# Patient Record
Sex: MALE | Race: WHITE | URBAN - METROPOLITAN AREA
[De-identification: names, ages, dates, MRNs, and addresses within clinical notes are randomized per-mention and may not be internally consistent; named-entity substitution may affect disease eponyms.]

---

## 2017-02-18 ENCOUNTER — APPOINTMENT (OUTPATIENT)
Dept: GENERAL RADIOLOGY | Facility: CLINIC | Age: 57
End: 2017-02-18
Attending: EMERGENCY MEDICINE
Payer: COMMERCIAL

## 2017-02-18 ENCOUNTER — HOSPITAL ENCOUNTER (OUTPATIENT)
Facility: CLINIC | Age: 57
Setting detail: OBSERVATION
Discharge: HOME | End: 2017-02-19
Attending: EMERGENCY MEDICINE | Admitting: HOSPITALIST
Payer: COMMERCIAL

## 2017-02-18 DIAGNOSIS — R07.9 ACUTE CHEST PAIN: ICD-10-CM

## 2017-02-18 LAB
ANION GAP SERPL CALCULATED.3IONS-SCNC: 6 MMOL/L (ref 3–14)
BASOPHILS # BLD AUTO: 0.1 10E9/L (ref 0–0.2)
BASOPHILS NFR BLD AUTO: 1.4 %
BUN SERPL-MCNC: 11 MG/DL (ref 7–30)
CALCIUM SERPL-MCNC: 8.8 MG/DL (ref 8.5–10.1)
CHLORIDE SERPL-SCNC: 102 MMOL/L (ref 94–109)
CO2 SERPL-SCNC: 30 MMOL/L (ref 20–32)
CREAT SERPL-MCNC: 1.08 MG/DL (ref 0.66–1.25)
D DIMER PPP FEU-MCNC: NORMAL UG/ML FEU (ref 0–0.5)
DIFFERENTIAL METHOD BLD: ABNORMAL
EOSINOPHIL # BLD AUTO: 0.1 10E9/L (ref 0–0.7)
EOSINOPHIL NFR BLD AUTO: 3.2 %
ERYTHROCYTE [DISTWIDTH] IN BLOOD BY AUTOMATED COUNT: 12.1 % (ref 10–15)
GFR SERPL CREATININE-BSD FRML MDRD: 70 ML/MIN/1.7M2
GLUCOSE SERPL-MCNC: 95 MG/DL (ref 70–99)
HCT VFR BLD AUTO: 44.4 % (ref 40–53)
HGB BLD-MCNC: 14.9 G/DL (ref 13.3–17.7)
IMM GRANULOCYTES # BLD: 0 10E9/L (ref 0–0.4)
IMM GRANULOCYTES NFR BLD: 0.2 %
LYMPHOCYTES # BLD AUTO: 1.3 10E9/L (ref 0.8–5.3)
LYMPHOCYTES NFR BLD AUTO: 30.7 %
MCH RBC QN AUTO: 33.4 PG (ref 26.5–33)
MCHC RBC AUTO-ENTMCNC: 33.6 G/DL (ref 31.5–36.5)
MCV RBC AUTO: 100 FL (ref 78–100)
MONOCYTES # BLD AUTO: 0.6 10E9/L (ref 0–1.3)
MONOCYTES NFR BLD AUTO: 13.3 %
NEUTROPHILS # BLD AUTO: 2.2 10E9/L (ref 1.6–8.3)
NEUTROPHILS NFR BLD AUTO: 51.2 %
NRBC # BLD AUTO: 0 10*3/UL
NRBC BLD AUTO-RTO: 0 /100
PLATELET # BLD AUTO: 193 10E9/L (ref 150–450)
POTASSIUM SERPL-SCNC: 4.5 MMOL/L (ref 3.4–5.3)
RBC # BLD AUTO: 4.46 10E12/L (ref 4.4–5.9)
SODIUM SERPL-SCNC: 138 MMOL/L (ref 133–144)
TROPONIN I BLD-MCNC: 0 UG/L (ref 0–0.1)
TROPONIN I SERPL-MCNC: NORMAL UG/L (ref 0–0.04)
WBC # BLD AUTO: 4.4 10E9/L (ref 4–11)

## 2017-02-18 PROCEDURE — G0378 HOSPITAL OBSERVATION PER HR: HCPCS

## 2017-02-18 PROCEDURE — 80048 BASIC METABOLIC PNL TOTAL CA: CPT | Performed by: EMERGENCY MEDICINE

## 2017-02-18 PROCEDURE — 25000132 ZZH RX MED GY IP 250 OP 250 PS 637

## 2017-02-18 PROCEDURE — 84484 ASSAY OF TROPONIN QUANT: CPT

## 2017-02-18 PROCEDURE — 99220 ZZC INITIAL OBSERVATION CARE,LEVL III: CPT | Performed by: PHYSICIAN ASSISTANT

## 2017-02-18 PROCEDURE — 85025 COMPLETE CBC W/AUTO DIFF WBC: CPT | Performed by: EMERGENCY MEDICINE

## 2017-02-18 PROCEDURE — 84484 ASSAY OF TROPONIN QUANT: CPT | Mod: 91 | Performed by: EMERGENCY MEDICINE

## 2017-02-18 PROCEDURE — 85379 FIBRIN DEGRADATION QUANT: CPT | Performed by: EMERGENCY MEDICINE

## 2017-02-18 PROCEDURE — 93005 ELECTROCARDIOGRAM TRACING: CPT

## 2017-02-18 PROCEDURE — 71020 XR CHEST 2 VW: CPT

## 2017-02-18 PROCEDURE — 25000132 ZZH RX MED GY IP 250 OP 250 PS 637: Performed by: PHYSICIAN ASSISTANT

## 2017-02-18 PROCEDURE — 99285 EMERGENCY DEPT VISIT HI MDM: CPT | Mod: 25

## 2017-02-18 PROCEDURE — 84484 ASSAY OF TROPONIN QUANT: CPT | Mod: 91 | Performed by: PHYSICIAN ASSISTANT

## 2017-02-18 PROCEDURE — 36415 COLL VENOUS BLD VENIPUNCTURE: CPT | Performed by: PHYSICIAN ASSISTANT

## 2017-02-18 RX ORDER — ASPIRIN 81 MG/1
81 TABLET, CHEWABLE ORAL DAILY
Status: DISCONTINUED | OUTPATIENT
Start: 2017-02-19 | End: 2017-02-19 | Stop reason: HOSPADM

## 2017-02-18 RX ORDER — CITALOPRAM HYDROBROMIDE 20 MG/1
40 TABLET ORAL DAILY
Status: DISCONTINUED | OUTPATIENT
Start: 2017-02-19 | End: 2017-02-19 | Stop reason: HOSPADM

## 2017-02-18 RX ORDER — ALUMINA, MAGNESIA, AND SIMETHICONE 2400; 2400; 240 MG/30ML; MG/30ML; MG/30ML
15-30 SUSPENSION ORAL EVERY 4 HOURS PRN
Status: DISCONTINUED | OUTPATIENT
Start: 2017-02-18 | End: 2017-02-19 | Stop reason: HOSPADM

## 2017-02-18 RX ORDER — LIDOCAINE 40 MG/G
CREAM TOPICAL
Status: DISCONTINUED | OUTPATIENT
Start: 2017-02-18 | End: 2017-02-19 | Stop reason: HOSPADM

## 2017-02-18 RX ORDER — ASPIRIN 81 MG/1
324 TABLET, CHEWABLE ORAL ONCE
Status: COMPLETED | OUTPATIENT
Start: 2017-02-18 | End: 2017-02-18

## 2017-02-18 RX ORDER — NITROGLYCERIN 0.4 MG/1
0.4 TABLET SUBLINGUAL EVERY 5 MIN PRN
Status: DISCONTINUED | OUTPATIENT
Start: 2017-02-18 | End: 2017-02-19 | Stop reason: HOSPADM

## 2017-02-18 RX ORDER — NALOXONE HYDROCHLORIDE 0.4 MG/ML
.1-.4 INJECTION, SOLUTION INTRAMUSCULAR; INTRAVENOUS; SUBCUTANEOUS
Status: DISCONTINUED | OUTPATIENT
Start: 2017-02-18 | End: 2017-02-19 | Stop reason: HOSPADM

## 2017-02-18 RX ORDER — OXYCODONE HYDROCHLORIDE 5 MG/1
5-10 TABLET ORAL
Status: DISCONTINUED | OUTPATIENT
Start: 2017-02-18 | End: 2017-02-19 | Stop reason: HOSPADM

## 2017-02-18 RX ORDER — ACETAMINOPHEN 325 MG/1
650 TABLET ORAL EVERY 4 HOURS PRN
Status: DISCONTINUED | OUTPATIENT
Start: 2017-02-18 | End: 2017-02-19 | Stop reason: HOSPADM

## 2017-02-18 RX ORDER — PANTOPRAZOLE SODIUM 40 MG/1
40 TABLET, DELAYED RELEASE ORAL
Status: DISCONTINUED | OUTPATIENT
Start: 2017-02-18 | End: 2017-02-19 | Stop reason: HOSPADM

## 2017-02-18 RX ORDER — BUPROPION HYDROCHLORIDE 150 MG/1
300 TABLET ORAL DAILY
Status: DISCONTINUED | OUTPATIENT
Start: 2017-02-19 | End: 2017-02-19 | Stop reason: HOSPADM

## 2017-02-18 RX ORDER — MORPHINE SULFATE 2 MG/ML
1 INJECTION, SOLUTION INTRAMUSCULAR; INTRAVENOUS
Status: DISCONTINUED | OUTPATIENT
Start: 2017-02-18 | End: 2017-02-19 | Stop reason: HOSPADM

## 2017-02-18 RX ORDER — ASPIRIN 81 MG/1
TABLET, CHEWABLE ORAL
Status: COMPLETED
Start: 2017-02-18 | End: 2017-02-18

## 2017-02-18 RX ADMIN — ASPIRIN 81 MG 324 MG: 81 TABLET ORAL at 15:11

## 2017-02-18 RX ADMIN — PANTOPRAZOLE SODIUM 40 MG: 40 TABLET, DELAYED RELEASE ORAL at 19:13

## 2017-02-18 RX ADMIN — ASPIRIN 324 MG: 81 TABLET, CHEWABLE ORAL at 15:11

## 2017-02-18 ASSESSMENT — ENCOUNTER SYMPTOMS
VOMITING: 0
LIGHT-HEADEDNESS: 1
SHORTNESS OF BREATH: 0
COUGH: 0
RHINORRHEA: 0
FEVER: 0
DIAPHORESIS: 0
CHILLS: 0
DIZZINESS: 1

## 2017-02-18 NOTE — ED PROVIDER NOTES
History     Chief Complaint:  Chest Pain    HPI   Adal King is a 56 year old male who presents with chest pain. The patient reports 1.5 hours ago he developed discomfort across his chest radiating into his jaw and dizziness and light headedness with mild nausea when standing up. The patient then developed left upper arm pain which then radiated into his arm. He called his daughter, an RN, who advised he visit the emergency department which prompts his visit. Currently the patient denies any discomfort and is symptom free. The patient denies vomiting, shortness of breath, diaphoresis, fevers, chills, runny nose, cough, or any associated symptoms.     PE/DVT Risk Factors:  The patient denies a personal or family history of PE, DVT, or other clotting disorders. The patient denies any recent travel, surgery, or other prolonged immobilization. The patient denies tobacco use.    CARDIAC RISK FACTORS  SEX:                m  Tobacco:            -  Hypertension:       -  Diabetes:           -  Lipids:             -  Family History:     +, grandfather  from MI at 63  Exercise:           -    Allergies:  No Known Drug Allergies    Medications:    Celexa  Wellbutrin    Past Medical History:    Depressive disorder    Past Surgical History:    History reviewed. No pertinent past surgical history.     Family History:    History reviewed. No pertinent family history.      Social History:  The patient was accompanied to the ED by wife.  Smoking Status: Never smoker  Smokeless Tobacco: No  Alcohol Use: Yes   Marital Status:   [2]     Review of Systems   Constitutional: Negative for chills, diaphoresis and fever.   HENT: Negative for rhinorrhea.    Respiratory: Negative for cough and shortness of breath.    Cardiovascular: Positive for chest pain.   Gastrointestinal: Negative for vomiting.   Neurological: Positive for dizziness and light-headedness.   All other systems reviewed and are negative.    Physical  "Exam   Vitals:  Patient Vitals for the past 24 hrs:   BP Temp Temp src Pulse Heart Rate Resp SpO2 Height Weight   02/18/17 1545 - - - - 69 14 100 % - -   02/18/17 1530 129/82 - - - - - - - -   02/18/17 1515 132/88 - - - 72 - 99 % - -   02/18/17 1506 - - - - 70 - 98 % - -   02/18/17 1500 122/89 - - - - - - - -   02/18/17 1457 (!) 131/96 98.4  F (36.9  C) Oral 71 - 22 - 1.905 m (6' 3\") 86.2 kg (190 lb)     Physical Exam  Physical Exam   Constitutional: The patient is oriented to person, place, and time. Alert and cooperative.  HENT:   Right Ear: External ear normal.   Left Ear: External ear normal.   Nose: Nose normal.   Mouth/Throat: Uvula is midline, oropharynx is clear and moist and mucous membranes are normal. No posterior oropharyngeal edema or erythema.   Eyes: Conjunctivae, EOM and lids are normal. Pupils are equal, round, and reactive to light.   Neck: Trachea normal. Normal range of motion. Neck supple.   Cardiovascular: Normal rate, regular rhythm, normal heart sounds, and intact distal pulses.    Pulmonary/Chest: Effort normal and breath sounds equal bilaterally. No crackles or wheezing.   Abdominal: Soft. Bowel sounds are normal. No tenderness. No rebound and no guarding.   Musculoskeletal: Normal range of motion.  No extremity tenderness or edema. No CVA tenderness.   Lymphadenopathy:  No cervical adenopathy.   Neurological: Alert and oriented. Normal strength. No cranial nerve deficit or sensory deficit.   Skin: Skin is dry. No rash noted.   Psychiatric: Normal mood and affect.     Emergency Department Course   ECG:  Completed at 1458.  Read at 1501.   Rate 73 bpm. IA interval 140. QRS duration 96. QT/QTc 378/416. P-R-T axes 64 52 49. normal sinus rhythm, cannot rule out anterior infarct, abnormal ECG     Imaging:  Radiographic findings were communicated with the patient who voiced understanding of the findings.    Chest X-ray (PA & LAT):    No acute disease.  Results per radiology.     Laboratory:  CBC: " WNL (WBC 4.4, HGB 14.9, )   BMP: WNL (Creat 1.08)   1457 Troponin: <0.015   1513 POCT Troponin: 0.00    Interventions:  Aspirin 324 mg tablet    Emergency Department Course:  Nursing notes and vitals reviewed.  I performed an exam of the patient as documented above.     Blood drawn. This was sent to the lab for further testing, results above.     The above imaging study(s) and ECG were ordered with results noted above.     The patient received the intervention(s) above.     1615: Updated patient.    Findings and plan explained to the Patient who consents to admission. Discussed the patient with the hospitalist service, who will admit the patient for further monitoring, evaluation, and treatment.     Impression & Plan    Medical Decision Making:  Adal King is a 56 year old male who presents with an episode of chest pressure that started approximately one hour prior to arrival to the emergency department. It had resolved by the time he arrived. He had pressure that radiated into the jaw and left arm with dizziness. Initial troponin and EKG are normal although due to the patient's symptoms we will admit for further evaluation and treatment.     Diagnosis:  Visit Diagnosis, Associated Orders, and Comments     ICD-10-CM    1. Acute chest pain R07.9        Disposition:  Admitted.    Madan DIGGS, am serving as a scribe at 4:31 PM on 2/18/2017 to document services personally performed by Dr. Lambert, based on my observations and the provider's statements to me.    [facility] EMERGENCY DEPARTMENT       Jacklyn Lambert MD  02/18/17 1108

## 2017-02-18 NOTE — ED NOTES
Pt complains of chest pain starting an hour ago associated with jaw discomfort, left arm numbness, and dizziness. Denies SOB/nausea.    Airway, breathing and circulation intact without need for intervention. Alert and interacting appropriately for age and situation.    HOME MEDICATIONS:  List in EPIC is correct.

## 2017-02-18 NOTE — PHARMACY-ADMISSION MEDICATION HISTORY
Admission medication history interview status for this patient is complete. See [facility] admission navigator for allergy information, prior to admission medications and immunization status.     Medication history interview source(s):Patient  Medication history resources (including written lists, pill bottles, clinic record): pill bottles  Primary pharmacy: [facility] mail order    Changes made to PTA medication list:  Added: all  Deleted: none  Changed: none    Actions taken by pharmacist (provider contacted, etc):None     Additional medication history information:None    Medication reconciliation/reorder completed by provider prior to medication history? No    Prior to Admission medications    Medication Sig Last Dose Taking? Auth Provider   Citalopram Hydrobromide (CELEXA PO) Take 40 mg by mouth daily  2/18/2017 at 1000 Yes Reported, Patient   Fish Oil-Cholecalciferol (FISH OIL + D3 PO) Take 2 capsules by mouth daily  2/18/2017 at 1000 Yes Reported, Patient   Pyridoxine HCl (VITAMIN B6 PO) Take 1 capsule by mouth daily  2/18/2017 at 1000 Yes Reported, Patient   BuPROPion HCl (WELLBUTRIN XL PO) Take 300 mg by mouth daily 2/18/2017 at 1000 Yes Reported, Patient   ASPIRIN ADULT LOW STRENGTH PO Take 81 mg by mouth daily 2/18/2017 at 1000 Yes Reported, Patient

## 2017-02-18 NOTE — IP AVS SNAPSHOT
MRN:[id number]                      After Visit Summary   2/18/2017    Adal King    MRN: [id number]           Thank you!     Thank you for choosing [facility] for your care. Our goal is always to provide you with excellent care. Hearing back from our patients is one way we can continue to improve our services. Please take a few minutes to complete the written survey that you may receive in the mail after you visit. If you would like to speak to someone directly about your visit please contact Patient Relations at [phone]. Thank you!          Patient Information     Date Of Birth          1960        About your hospital stay     You were admitted on:  February 18, 2017 You last received care in the:  [facility] Observation Department    You were discharged on:  February 19, 2017        Reason for your hospital stay       Chest pain. Unclear etiology. Not cardiac.                  Who to Call     For medical emergencies, please call 911.  For non-urgent questions about your medical care, please call your primary care provider or clinic, [phone]          Attending Provider     Provider Specialty    Jacklyn Lambert MD Emergency Medicine    Howie Bustillos MD Internal Medicine       Primary Care Provider Office Phone # Fax #    John Hercules [phone]       [facility] [address] [facility] [address]        After Care Instructions     Activity       Your activity upon discharge: activity as tolerated            Diet       Follow this diet upon discharge: Advance to a regular diet as tolerated (gluten free)                  Follow-up Appointments     Follow-up and recommended labs and tests        Follow up with primary care provider, John Hercules, within 7 days for hospital follow- up.  No follow up labs or test are needed.                             Pending Results     No orders found for last 3  "day(s).            Statement of Approval     Ordered          17 1140  I have reviewed and agree with all the recommendations and orders detailed in this document.  EFFECTIVE NOW     Approved and electronically signed by:  Howie Bustillos MD             Admission Information     Date & Time Provider Department Dept. Phone    2017 Howie Bustillos MD [facility] Observation Department [phone]      Your Vitals Were     Blood Pressure Pulse Temperature Respirations Height Weight    110/72 (BP Location: Right arm) 80 96.4  F (35.8  C) (Oral) 16 1.905 m (6' 3\") 86.7 kg (191 lb 1.6 oz)    Pulse Oximetry BMI (Body Mass Index)                96% 23.89 kg/m2          [employer] Information     [employer] lets you send messages to your doctor, view your test results, renew your prescriptions, schedule appointments and more. To sign up, go to [email or web address] . Click on \"Log in\" on the left side of the screen, which will take you to the Welcome page. Then click on \"Sign up Now\" on the right side of the page.     You will be asked to enter the access code listed below, as well as some personal information. Please follow the directions to create your username and password.     Your access code is: [email or web address]  Expires: 2017 11:44 AM     Your access code will  in 90 days. If you need help or a new code, please call your [place] clinic or [phone].        Care EveryWhere ID     This is your Care EveryWhere ID. This could be used by other organizations to access your [place] medical records  [id number]           Review of your medicines      UNREVIEWED medicines. Ask your doctor about these medicines        Dose / Directions    FISH OIL + D3 PO        Dose:  2 capsule   Take 2 capsules by mouth daily   Refills:  0         CONTINUE these medicines which have NOT CHANGED        Dose / Directions    ASPIRIN ADULT LOW STRENGTH PO        Dose:  81 mg   Take 81 mg by mouth daily "   Refills:  0       CELEXA PO        Dose:  40 mg   Take 40 mg by mouth daily   Refills:  0       VITAMIN B6 PO        Dose:  1 capsule   Take 1 capsule by mouth daily   Refills:  0       WELLBUTRIN XL PO        Dose:  300 mg   Take 300 mg by mouth daily   Refills:  0                Protect others around you: Learn how to safely use, store and throw away your medicines at www.disposemymeds.org.             Medication List: This is a list of all your medications and when to take them. Check marks below indicate your daily home schedule. Keep this list as a reference.      Medications           Morning Afternoon Evening Bedtime As Needed    ASPIRIN ADULT LOW STRENGTH PO   Take 81 mg by mouth daily   Last time this was given:  81 mg on 2/19/2017  8:34 AM                                CELEXA PO   Take 40 mg by mouth daily   Last time this was given:  40 mg on 2/19/2017  8:33 AM                                FISH OIL + D3 PO   Take 2 capsules by mouth daily                                VITAMIN B6 PO   Take 1 capsule by mouth daily                                WELLBUTRIN XL PO   Take 300 mg by mouth daily   Last time this was given:  300 mg on 2/19/2017  8:33 AM

## 2017-02-18 NOTE — IP AVS SNAPSHOT
[facility] Observation Department    [address]    [facility] [address]    Phone:  [phone]                                       After Visit Summary   2/18/2017    Adal King    MRN: [id number]           After Visit Summary Signature Page     I have received my discharge instructions, and my questions have been answered. I have discussed any challenges I see with this plan with the nurse or doctor.    ..........................................................................................................................................  Patient/Patient Representative Signature      ..........................................................................................................................................  Patient Representative Print Name and Relationship to Patient    ..................................................               ................................................  Date                                            Time    ..........................................................................................................................................  Reviewed by Signature/Title    ...................................................              ..............................................  Date                                                            Time
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
## 2017-02-19 ENCOUNTER — APPOINTMENT (OUTPATIENT)
Dept: CARDIOLOGY | Facility: CLINIC | Age: 57
End: 2017-02-19
Attending: PHYSICIAN ASSISTANT
Payer: COMMERCIAL

## 2017-02-19 VITALS
RESPIRATION RATE: 16 BRPM | WEIGHT: 191.1 LBS | BODY MASS INDEX: 23.76 KG/M2 | HEIGHT: 75 IN | TEMPERATURE: 96.4 F | HEART RATE: 80 BPM | SYSTOLIC BLOOD PRESSURE: 110 MMHG | DIASTOLIC BLOOD PRESSURE: 72 MMHG | OXYGEN SATURATION: 96 %

## 2017-02-19 LAB
CHOLEST SERPL-MCNC: 190 MG/DL
HDLC SERPL-MCNC: 60 MG/DL
INTERPRETATION ECG - MUSE: NORMAL
LDLC SERPL CALC-MCNC: 104 MG/DL
NONHDLC SERPL-MCNC: 130 MG/DL
TRIGL SERPL-MCNC: 130 MG/DL
TROPONIN I SERPL-MCNC: NORMAL UG/L (ref 0–0.04)

## 2017-02-19 PROCEDURE — 93325 DOPPLER ECHO COLOR FLOW MAPG: CPT | Mod: 26 | Performed by: INTERNAL MEDICINE

## 2017-02-19 PROCEDURE — 93321 DOPPLER ECHO F-UP/LMTD STD: CPT | Mod: 26 | Performed by: INTERNAL MEDICINE

## 2017-02-19 PROCEDURE — 99217 ZZC OBSERVATION CARE DISCHARGE: CPT | Performed by: HOSPITALIST

## 2017-02-19 PROCEDURE — 84484 ASSAY OF TROPONIN QUANT: CPT | Performed by: PHYSICIAN ASSISTANT

## 2017-02-19 PROCEDURE — 36415 COLL VENOUS BLD VENIPUNCTURE: CPT | Performed by: PHYSICIAN ASSISTANT

## 2017-02-19 PROCEDURE — 93350 STRESS TTE ONLY: CPT | Mod: 26 | Performed by: INTERNAL MEDICINE

## 2017-02-19 PROCEDURE — 80061 LIPID PANEL: CPT | Performed by: PHYSICIAN ASSISTANT

## 2017-02-19 PROCEDURE — 40000264 ECHO STRESS TEST WITH LUMASON

## 2017-02-19 PROCEDURE — 25000132 ZZH RX MED GY IP 250 OP 250 PS 637: Performed by: PHYSICIAN ASSISTANT

## 2017-02-19 PROCEDURE — G0378 HOSPITAL OBSERVATION PER HR: HCPCS

## 2017-02-19 PROCEDURE — 25500064 ZZH RX 255 OP 636: Performed by: HOSPITALIST

## 2017-02-19 PROCEDURE — 93016 CV STRESS TEST SUPVJ ONLY: CPT | Performed by: INTERNAL MEDICINE

## 2017-02-19 PROCEDURE — 93018 CV STRESS TEST I&R ONLY: CPT | Performed by: INTERNAL MEDICINE

## 2017-02-19 RX ADMIN — PANTOPRAZOLE SODIUM 40 MG: 40 TABLET, DELAYED RELEASE ORAL at 08:34

## 2017-02-19 RX ADMIN — BUPROPION HYDROCHLORIDE 300 MG: 150 TABLET, FILM COATED, EXTENDED RELEASE ORAL at 08:33

## 2017-02-19 RX ADMIN — CITALOPRAM HYDROBROMIDE 40 MG: 20 TABLET ORAL at 08:33

## 2017-02-19 RX ADMIN — SULFUR HEXAFLUORIDE 5 ML: KIT at 07:39

## 2017-02-19 RX ADMIN — ASPIRIN 81 MG 81 MG: 81 TABLET ORAL at 08:34

## 2017-02-19 NOTE — DISCHARGE SUMMARY
"[facility]  Discharge Summary  Name: Adal King    MRN: [id number]  YOB: 1960    Age: 56 year old  Date of Discharge:  2/19/2017  Date of Admission: 2/18/2017  Primary Care Provider: John Hercules  Discharge Physician:  Howie Bustillos MD  Discharging Service:  Hospitalist      Discharge Diagnosis:  Chest pain, not cardiac     Other Diagnosis:  Depression, celiac, psoriasis     Discharge Disposition:  Discharged to home     Allergies:  No Known Allergies     Discharge Medications:   Current Discharge Medication List      CONTINUE these medications which have NOT CHANGED    Details   Citalopram Hydrobromide (CELEXA PO) Take 40 mg by mouth daily       Fish Oil-Cholecalciferol (FISH OIL + D3 PO) Take 2 capsules by mouth daily       Pyridoxine HCl (VITAMIN B6 PO) Take 1 capsule by mouth daily       BuPROPion HCl (WELLBUTRIN XL PO) Take 300 mg by mouth daily      ASPIRIN ADULT LOW STRENGTH PO Take 81 mg by mouth daily              Condition on Discharge:  Discharge condition: Stable   Discharge vitals: Blood pressure 115/79, pulse 71, temperature 96  F (35.6  C), temperature source Oral, resp. rate 16, height 1.905 m (6' 3\"), weight 86.7 kg (191 lb 1.6 oz), SpO2 98 %.   Code status on discharge: Full Code     History of Illness:  See detailed admission note for full details.   56 year old male with a history of Depression, Psoriasis and Celiac disease who presents to the ED today 2/2 acute chest pain.     Patient states he had a leisurely morning and felt fine earlier today. He was in his home when he had a sudden onset of discomfort across his chest radiating into his bilateral jaws, left arm with numbness and tingling and lightheadedness. He denies this as feeling like a \"pain\", but more \"discomfort\". He denies shortness of breath or diaphoresis, palpitations or pain like this in the past. He denies back pain, abdominal pain, nausea, emesis, reflux symptoms, recent overuse of upper " extremities, lower extremity edema, focal neurologic deficits. He has never had a stress test in the past. He does drink alcohol of 2-3 drinks per day with no history of withdrawals and no know liver disease. He has a family hx of CAD. His chest discomfort began between 130-2pm and persisted until 3pm when he arrived at the ED and has since resolved.      ED work up revealed patient hemodynamically stable and chest pain free. Laboratory work up revealed normal BMP, troponin and CBC. CXR was negative. EKG with no acute ischemic changes. Patient received ASA and admitted for further ACS rule out.     Significant Physical Exam Findings:  Patient in bed. Wife in room. No further chest pain. No other symptoms/complaints  s1s2 rrr, lungs clear, abdo +BS, no tenderness    Procedures:  Stress echo     Imaging:  Results for orders placed or performed during the hospital encounter of 02/18/17   Chest XR,  PA & LAT    Narrative    CHEST TWO VIEWS 2/18/2017 4:03 PM     HISTORY: Chest pain.    COMPARISON: None.    FINDINGS: There are no acute infiltrates. The cardiac silhouette is  not enlarged. Pulmonary vasculature is unremarkable.      Impression    IMPRESSION: No acute disease.     MARIAM HWANG MD        Consultations:  No consultations were requested during this admission.     Significant Lab Results:    Recent Labs  Lab 02/18/17  1457   WBC 4.4   HGB 14.9   HCT 44.4                Lab Results   Component Value Date     02/18/2017    Lab Results   Component Value Date    CHLORIDE 102 02/18/2017    Lab Results   Component Value Date    BUN 11 02/18/2017      Lab Results   Component Value Date    POTASSIUM 4.5 02/18/2017    Lab Results   Component Value Date    CO2 30 02/18/2017    Lab Results   Component Value Date    CR 1.08 02/18/2017          Recent Labs  Lab 02/19/17  0308 02/18/17  2120 02/18/17  1751 02/18/17  1513   TROPONIN  --   --   --  0.00   TROPI <0.015The 99th percentile for upper reference  range is 0.045 ug/L.  Troponin values in the range of 0.045 - 0.120 ug/L may be associated with risks of adverse clinical events. <0.015The 99th percentile for upper reference range is 0.045 ug/L.  Troponin values in the range of 0.045 - 0.120 ug/L may be associated with risks of adverse clinical events. <0.015The 99th percentile for upper reference range is 0.045 ug/L.  Troponin values in the range of 0.045 - 0.120 ug/L may be associated with risks of adverse clinical events.  --         Pending Results:    Unresulted Labs Ordered in the Past 30 Days of this Admission     No orders found for last 61 day(s).           Discharge Instructions and Follow-Up:   Discharge diet:   Active Diet Order      Gluten Free Diet No Caffeine for 24 hours (once tests completed, may have caffeine)      Diet   Discharge activity: Activity as tolerated   Discharge follow-up: Follow up with primary care provider in 7 days   Outpatient therapy: None    Home Care agency: None    Other instructions: None      Hospital Course:  Patient was admitted to the Obs Unit. Trops and ekg were negative. Tele was negative. Symptoms did not return. Stress ECHO showed no ischemia. Spoke to patient about his symptoms. May be related to alcohol use/gastritis.     Total time spent in face to face contact with the patient and coordinating discharge was:  30 Minutes.    Howie Bustillos MD  Pager: [phone]

## 2017-02-19 NOTE — PLAN OF CARE
Problem: Discharge Planning  Goal: Discharge Planning (Adult, OB, Behavioral, Peds)  Outcome: Improving  PRIMARY DIAGNOSIS: CHEST PAIN  OUTPATIENT/OBSERVATION GOALS TO BE MET BEFORE DISCHARGE:     1. Negative Serial Troponin Yes x4  2. Resolution of chest pain Yes  3. Pain status: Pain free.  4. Negative stress test : Patient's results from morning stress test pending  5. Stable vital signs Yes  6. ADLs back to baseline?  No  7. Activity and level of assistance: Ambulating independently.  8. Barriers to discharge noted No  9.Interpretation of rhythm per telemetry tech:SR rate 77     Patient is alert and oriented. Patient denies any symptoms of chest pain/SOB/palpitations/jaw pain/extremity numbness. Patient says he is not having any pain. Trops neg x4. Patient had stress test done this AM, results pending. Patient Ind in room.

## 2017-02-19 NOTE — PLAN OF CARE
Problem: Discharge Planning  Goal: Discharge Planning (Adult, OB, Behavioral, Peds)  Outcome: Adequate for Discharge Date Met:  02/19/17  PRIMARY DIAGNOSIS: CHEST PAIN  OUTPATIENT/OBSERVATION GOALS TO BE MET BEFORE DISCHARGE:     1. Negative Serial Troponin Yes  2. Resolution of chest pain Yes  3. Pain status: Pain free.  4. Negative stress test Yes  5. Stable vital signs Yes  6. ADLs back to baseline?  Yes  7. Activity and level of assistance: Ambulating independently.  8. Barriers to discharge noted No  9.Interpretation of rhythm per telemetry tech:SR                       Patient/Caregiver provided printed discharge information.

## 2017-02-19 NOTE — PROGRESS NOTES
ROOM #[id number]    Living Situation (if not independent, order SW consult): House with wife  Facility name:    Activity level at baseline: Ind  Activity level on admit: Ind    Is patient a falls risk? No  Falls armband on? Not applicable,   Within Arm's Reach? No.Reason not within arm's reach is: n/a  Bed alarm turned on?   Not applicable,   Personal alarm in place and turned on?   Not applicable,     Patient registered to observation; given Patient Bill of Rights; given the opportunity to ask questions about observation status and their plan of care.  Patient has been oriented to the observation room, bathroom and call light is in place.    : Jammie wife, see demographics

## 2017-02-19 NOTE — PLAN OF CARE
Problem: Discharge Planning  Goal: Discharge Planning (Adult, OB, Behavioral, Peds)  PRIMARY DIAGNOSIS: CHEST PAIN  OUTPATIENT/OBSERVATION GOALS TO BE MET BEFORE DISCHARGE:      1. Negative Serial Troponin Negative x 4  2. Resolution of chest pain Yes  3. Pain status: Pain free.  4. Negative stress test Will have stress test 2/19  5. Stable vital signs Yes  6. ADLs back to baseline? Yes  7. Activity and level of assistance: Ambulating independently.  8. Barriers to discharge noted No  9.Interpretation of rhythm per telemetry tech: SR in 70's  No complaints of chest pain. Plan for exercise stress today.

## 2017-02-19 NOTE — PLAN OF CARE
Problem: Discharge Planning  Goal: Discharge Planning (Adult, OB, Behavioral, Peds)  PRIMARY DIAGNOSIS: CHEST PAIN  OUTPATIENT/OBSERVATION GOALS TO BE MET BEFORE DISCHARGE:     1. Negative Serial Troponin Negative x 2  2. Resolution of chest pain Yes  3. Pain status: Pain free.  4. Negative stress test Will have stress test 2/19  5. Stable vital signs Yes  6. ADLs back to baseline?  Yes  7. Activity and level of assistance: Ambulating independently.  8. Barriers to discharge noted No  9.Interpretation of rhythm per telemetry tech: SR in 60s.     A&O x 4.  Denies CP.  Patient reports he is gluten free d/t celiacs.  Diet updated.  Next troponin at 2100.

## 2017-02-19 NOTE — PLAN OF CARE
Problem: Discharge Planning  Goal: Discharge Planning (Adult, OB, Behavioral, Peds)  Outcome: Improving  Problem: Discharge Planning  Goal: Discharge Planning (Adult, OB, Behavioral, Peds)  PRIMARY DIAGNOSIS: CHEST PAIN  OUTPATIENT/OBSERVATION GOALS TO BE MET BEFORE DISCHARGE:      1. Negative Serial Troponin Negative x 3  2. Resolution of chest pain Yes  3. Pain status: Pain free.  4. Negative stress test Will have stress test 2/19  5. Stable vital signs Yes  6. ADLs back to baseline? Yes  7. Activity and level of assistance: Ambulating independently.  8. Barriers to discharge noted No  9.Interpretation of rhythm per telemetry tech: SR in 70's

## 2017-05-24 ENCOUNTER — DOCUMENTATION ONLY (OUTPATIENT)
Dept: OTHER | Facility: CLINIC | Age: 57
End: 2017-05-24

## 2017-05-24 PROBLEM — Z71.89 ACP (ADVANCE CARE PLANNING): Chronic | Status: ACTIVE | Noted: 2017-05-24
